# Patient Record
Sex: MALE | Race: OTHER | NOT HISPANIC OR LATINO | URBAN - METROPOLITAN AREA
[De-identification: names, ages, dates, MRNs, and addresses within clinical notes are randomized per-mention and may not be internally consistent; named-entity substitution may affect disease eponyms.]

---

## 2018-12-10 ENCOUNTER — EMERGENCY (EMERGENCY)
Facility: HOSPITAL | Age: 38
LOS: 1 days | Discharge: HOME | End: 2018-12-10
Attending: EMERGENCY MEDICINE | Admitting: EMERGENCY MEDICINE
Payer: SELF-PAY

## 2018-12-10 VITALS
OXYGEN SATURATION: 98 % | SYSTOLIC BLOOD PRESSURE: 159 MMHG | TEMPERATURE: 98 F | DIASTOLIC BLOOD PRESSURE: 63 MMHG | HEART RATE: 77 BPM | RESPIRATION RATE: 17 BRPM

## 2018-12-10 VITALS
HEART RATE: 86 BPM | DIASTOLIC BLOOD PRESSURE: 97 MMHG | WEIGHT: 259.93 LBS | OXYGEN SATURATION: 98 % | TEMPERATURE: 99 F | RESPIRATION RATE: 18 BRPM | SYSTOLIC BLOOD PRESSURE: 150 MMHG
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
## 2018-12-10 DIAGNOSIS — T74.11XA ADULT PHYSICAL ABUSE, CONFIRMED, INITIAL ENCOUNTER: ICD-10-CM

## 2018-12-10 DIAGNOSIS — S01.81XA LACERATION WITHOUT FOREIGN BODY OF OTHER PART OF HEAD, INITIAL ENCOUNTER: ICD-10-CM

## 2018-12-10 DIAGNOSIS — Z23 ENCOUNTER FOR IMMUNIZATION: ICD-10-CM
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
## 2018-12-10 DIAGNOSIS — S01.112A LACERATION WITHOUT FOREIGN BODY OF LEFT EYELID AND PERIOCULAR AREA, INITIAL ENCOUNTER: ICD-10-CM

## 2018-12-10 PROCEDURE — 70480 CT ORBIT/EAR/FOSSA W/O DYE: CPT | Mod: 26,59

## 2018-12-10 PROCEDURE — 99284 EMERGENCY DEPT VISIT MOD MDM: CPT | Mod: 25

## 2018-12-10 PROCEDURE — 70450 CT HEAD/BRAIN W/O DYE: CPT | Mod: 26

## 2018-12-10 PROCEDURE — 90715 TDAP VACCINE 7 YRS/> IM: CPT

## 2018-12-10 PROCEDURE — 99053 MED SERV 10PM-8AM 24 HR FAC: CPT

## 2018-12-10 PROCEDURE — 70450 CT HEAD/BRAIN W/O DYE: CPT

## 2018-12-10 PROCEDURE — 70480 CT ORBIT/EAR/FOSSA W/O DYE: CPT

## 2018-12-10 PROCEDURE — 90471 IMMUNIZATION ADMIN: CPT

## 2018-12-10 RX ORDER — BACITRACIN 500 [USP'U]/G
1 OINTMENT OPHTHALMIC ONCE
Qty: 0 | Refills: 0 | Status: COMPLETED | OUTPATIENT
Start: 2018-12-10 | End: 2018-12-10

## 2018-12-10 RX ORDER — TETANUS TOXOID, REDUCED DIPHTHERIA TOXOID AND ACELLULAR PERTUSSIS VACCINE, ADSORBED 5; 2.5; 8; 8; 2.5 [IU]/.5ML; [IU]/.5ML; UG/.5ML; UG/.5ML; UG/.5ML
0.5 SUSPENSION INTRAMUSCULAR ONCE
Qty: 0 | Refills: 0 | Status: COMPLETED | OUTPATIENT
Start: 2018-12-10 | End: 2018-12-10

## 2018-12-10 RX ORDER — CEPHALEXIN 500 MG
500 CAPSULE ORAL ONCE
Qty: 0 | Refills: 0 | Status: COMPLETED | OUTPATIENT
Start: 2018-12-10 | End: 2018-12-10

## 2018-12-10 RX ORDER — BACITRACIN 500 [USP'U]/G
1 OINTMENT OPHTHALMIC ONCE
Qty: 0 | Refills: 0 | Status: DISCONTINUED | OUTPATIENT
Start: 2018-12-10 | End: 2018-12-10

## 2018-12-10 RX ORDER — ACETAMINOPHEN 500 MG
1000 TABLET ORAL ONCE
Qty: 0 | Refills: 0 | Status: COMPLETED | OUTPATIENT
Start: 2018-12-10 | End: 2018-12-10

## 2018-12-10 RX ORDER — CEPHALEXIN 500 MG
1 CAPSULE ORAL
Qty: 10 | Refills: 0 | OUTPATIENT
Start: 2018-12-10 | End: 2018-12-14

## 2018-12-10 RX ADMIN — BACITRACIN 1 APPLICATION(S): 500 OINTMENT OPHTHALMIC at 07:59

## 2018-12-10 RX ADMIN — Medication 1000 MILLIGRAM(S): at 03:40

## 2018-12-10 RX ADMIN — Medication 500 MILLIGRAM(S): at 07:55

## 2018-12-10 RX ADMIN — Medication 1000 MILLIGRAM(S): at 02:48

## 2018-12-10 RX ADMIN — TETANUS TOXOID, REDUCED DIPHTHERIA TOXOID AND ACELLULAR PERTUSSIS VACCINE, ADSORBED 0.5 MILLILITER(S): 5; 2.5; 8; 8; 2.5 SUSPENSION INTRAMUSCULAR at 02:36

## 2018-12-10 NOTE — ED PROVIDER NOTE - NS ED ROS FT
NEURO: no LOC, headache, dizziness, weakness, focal weakness/tingling/numbness   EYE: no blurry or double vision   ENT: no epistaxis, rhinorrhea, otorrhea, dental injury   PULM: no difficulty breathing   CV: no chest pain or palpitations; no syncope   GI: no abdominal pain or vomiting   : no pelvic pain, no genital injury   MSK: no neck or back pain, no joint pain   SKIN: facial lacerations  IMMUN: tetanus unk NEURO: no LOC, headache, dizziness, weakness, focal weakness/tingling/numbness   EYE: no blurry or double vision; no flashes or floaters  ENT: no epistaxis, rhinorrhea, otorrhea, dental injury   PULM: no difficulty breathing   CV: no chest pain or palpitations; no syncope   GI: no abdominal pain or vomiting   : no pelvic pain, no genital injury   MSK: no neck or back pain, no joint pain   SKIN: facial lacerations  IMMUN: tetanus unk

## 2018-12-10 NOTE — ED PROVIDER NOTE - NSFOLLOWUPINSTRUCTIONS_ED_ALL_ED_FT
Laceration Care, Adult  ImageA laceration is a cut that goes through all layers of the skin. The cut also goes into the tissue that is right under the skin. Some cuts heal on their own. Others need to be closed with stitches (sutures), staples, skin adhesive strips, or wound glue. Taking care of your cut lowers your risk of infection and helps your cut to heal better.    How to take care of your cut  For stitches or staples:     Keep the wound clean and dry.  If you were given a bandage (dressing), you should change it at least one time per day or as told by your doctor. You should also change it if it gets wet or dirty.  Keep the wound completely dry for the first 24 hours or as told by your doctor. After that time, you may take a shower or a bath. However, make sure that the wound is not soaked in water until after the stitches or staples have been removed.  Clean the wound one time each day or as told by your doctor:    Wash the wound with soap and water.  Rinse the wound with water until all of the soap comes off.  Pat the wound dry with a clean towel. Do not rub the wound.    After you clean the wound, put a thin layer of antibiotic ointment on it as told by your doctor. This ointment:    Helps to prevent infection.  Keeps the bandage from sticking to the wound.    Have your stitches or staples removed as told by your doctor.  If your doctor used skin adhesive strips:     Keep the wound clean and dry.  If you were given a bandage, you should change it at least one time per day or as told by your doctor. You should also change it if it gets dirty or wet.  Do not get the skin adhesive strips wet. You can take a shower or a bath, but be careful to keep the wound dry.  If the wound gets wet, pat it dry with a clean towel. Do not rub the wound.  Skin adhesive strips fall off on their own. You can trim the strips as the wound heals. Do not remove any strips that are still stuck to the wound. They will fall off after a while.  If your doctor used wound glue:     Try to keep your wound dry, but you may briefly wet it in the shower or bath. Do not soak the wound in water, such as by swimming.  After you take a shower or a bath, gently pat the wound dry with a clean towel. Do not rub the wound.  Do not do any activities that will make you really sweaty until the skin glue has fallen off on its own.  Do not apply liquid, cream, or ointment medicine to your wound while the skin glue is still on.  If you were given a bandage, you should change it at least one time per day or as told by your doctor. You should also change it if it gets dirty or wet.  If a bandage is placed over the wound, do not let the tape for the bandage touch the skin glue.  Do not pick at the glue. The skin glue usually stays on for 5–10 days. Then, it falls off of the skin.  General Instructions     To help prevent scarring, make sure to cover your wound with sunscreen whenever you are outside after stitches are removed, after adhesive strips are removed, or when wound glue stays in place and the wound is healed. Make sure to wear a sunscreen of at least 30 SPF.  Take over-the-counter and prescription medicines only as told by your doctor.  If you were given antibiotic medicine or ointment, take or apply it as told by your doctor. Do not stop using the antibiotic even if your wound is getting better.  Do not scratch or pick at the wound.  Keep all follow-up visits as told by your doctor. This is important.  Check your wound every day for signs of infection. Watch for:    Redness, swelling, or pain.  Fluid, blood, or pus.    Raise (elevate) the injured area above the level of your heart while you are sitting or lying down, if possible.  Get help if:  You got a tetanus shot and you have any of these problems at the injection site:    Swelling.  Very bad pain.  Redness.  Bleeding.    You have a fever.  A wound that was closed breaks open.  You notice a bad smell coming from your wound or your bandage.  You notice something coming out of the wound, such as wood or glass.  Medicine does not help your pain.  You have more redness, swelling, or pain at the site of your wound.  You have fluid, blood, or pus coming from your wound.  You notice a change in the color of your skin near your wound.  You need to change the bandage often because fluid, blood, or pus is coming from the wound.  You start to have a new rash.  You start to have numbness around the wound.  Get help right away if:  You have very bad swelling around the wound.  Your pain suddenly gets worse and is very bad.  You notice painful lumps near the wound or on skin that is anywhere on your body.  You have a red streak going away from your wound.  The wound is on your hand or foot and you cannot move a finger or toe like you usually can.  The wound is on your hand or foot and you notice that your fingers or toes look pale
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
## 2018-12-10 NOTE — ED PROVIDER NOTE - PROGRESS NOTE DETAILS
Director - Dr Ontiveros contacted ~230a and stated he would come in for repair; Dr Ontiveros contacted again at 630 - states he will be here by 730a.  Pt will be signed out to AM team pending plastics repair. alea: pt received at sign out from rod webber as pending plastics repair of facial lacs, then to be dc'd

## 2018-12-10 NOTE — ED PROVIDER NOTE - CARE PLAN
Principal Discharge DX:	Forehead laceration, initial encounter  Secondary Diagnosis:	Eyelid laceration, right, initial encounter
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
## 2018-12-10 NOTE — ED PROVIDER NOTE - MEDICAL DECISION MAKING DETAILS
Facial trauma with complex lacerations to repaired by plastics.  CT head and orbits negative except for nasal fracture. Facial trauma with complex lacerations to repaired by plastics.  CT head and orbits negative except for nasal fracture.  Tetanus booster given. Facial trauma with complex lacerations to repaired by plastics.  CT head and orbits negative except for nasal fracture.  Tetanus booster given. Signed out to day team at 7 am pending plastics repair.

## 2018-12-10 NOTE — ED PROVIDER NOTE - PHYSICAL EXAMINATION
CONSTITUTIONAL: WD,WN. NAD.    SKIN: Normal color and turgor. No rash.    HEAD: Deep, stellate 4 cm laceration to glabellar region.    EYES: Right eye periorbital ecchymosis and lids swollen shut.  2 cm linear laceration to lower eyelid apx 2 mm from and parallel to the lid margin.  PERRL. EOMI. AC clear, no hyphema.   ENT: Airway patent, OP without erythema, tonsillar swelling or exudate; uvula midline without swelling. Nasal mucosa clear, no rhinorrhea. No septal hematoma.  RESPIRATORY:  Breathing non-labored. No retractions or accessory muscle use.  Lungs CTA bilat.  CARDIOVASCULAR:  RRR, S1S2. No M/R/G.      GI:  Abdomen soft, nontender.    MSK: Neck supple with painless ROM.  No lower extremity edema or calf tenderness.  No joint swelling or ROM limitation.  NEURO: Alert and oriented; CN II-XII intact. Speech clear. 5/5 strength in all extremities.  Normal balance and gait. CONSTITUTIONAL: WD,WN. NAD.    SKIN: Normal color and turgor. No rash.    HEAD: Deep, stellate 4 cm laceration to glabellar region.    EYES: Right eye periorbital ecchymosis and lids swollen shut.  2 cm linear laceration to lower eyelid apx 2 mm from and parallel to the lid margin.  PERRL. EOMI. AC clear, no hyphema.   ENT: Airway patent, OP without erythema, tonsillar swelling or exudate; uvula midline without swelling. Nasal mucosa clear, no rhinorrhea. No septal hematoma.  No otorrhea or hemotympanum.  RESPIRATORY:  Breathing non-labored. No retractions or accessory muscle use.  Lungs CTA bilat.  CARDIOVASCULAR:  RRR, S1S2. No M/R/G.      GI:  Abdomen soft, nontender.    MSK: Neck supple with painless ROM.  No lower extremity edema or calf tenderness.  No joint swelling or ROM limitation.  NEURO: Alert and oriented; CN II-XII intact. Speech clear. 5/5 strength in all extremities.  Normal balance and gait. CONSTITUTIONAL: WD,WN. NAD.    SKIN: Normal color and turgor. No rash.    HEAD: Deep, stellate 4 cm laceration to glabellar region.    EYES: Uncorrected VA: 20/15 left, 20/15 right.  Right eye periorbital ecchymosis and lids swollen shut.  2 cm linear laceration to lower eyelid apx 2 mm from and parallel to the lid margin.  PERRL. EOMI. AC clear, no hyphema.   ENT: Airway patent, OP without erythema, tonsillar swelling or exudate; uvula midline without swelling. Nasal mucosa clear, no rhinorrhea. No septal hematoma.  No otorrhea or hemotympanum.  RESPIRATORY:  Breathing non-labored. No retractions or accessory muscle use.  Lungs CTA bilat.  CARDIOVASCULAR:  RRR, S1S2. No M/R/G.      GI:  Abdomen soft, nontender.    MSK: Neck supple with painless ROM.  No lower extremity edema or calf tenderness.  No joint swelling or ROM limitation.  NEURO: Alert and oriented; CN II-XII intact. Speech clear. 5/5 strength in all extremities.  Normal balance and gait.

## 2018-12-10 NOTE — ED PROVIDER NOTE - CARE PROVIDER_API CALL
Dayan Ontiveros), Plastic Surgery; Surgery  [address] [place] [address]  Phone: [phone]  Fax: [phone]